# Patient Record
Sex: MALE | Race: OTHER | HISPANIC OR LATINO | Employment: OTHER | URBAN - METROPOLITAN AREA
[De-identification: names, ages, dates, MRNs, and addresses within clinical notes are randomized per-mention and may not be internally consistent; named-entity substitution may affect disease eponyms.]

---

## 2024-02-29 ENCOUNTER — OFFICE VISIT (OUTPATIENT)
Dept: OTOLARYNGOLOGY | Facility: HOSPITAL | Age: 59
End: 2024-02-29
Payer: COMMERCIAL

## 2024-02-29 VITALS — BODY MASS INDEX: 29.87 KG/M2 | TEMPERATURE: 96.8 F | HEIGHT: 69 IN | WEIGHT: 201.7 LBS

## 2024-02-29 DIAGNOSIS — J38.3 LESION OF VOCAL CORD: ICD-10-CM

## 2024-02-29 DIAGNOSIS — J34.89 NOSE DRYNESS: ICD-10-CM

## 2024-02-29 PROCEDURE — 99213 OFFICE O/P EST LOW 20 MIN: CPT | Performed by: STUDENT IN AN ORGANIZED HEALTH CARE EDUCATION/TRAINING PROGRAM

## 2024-02-29 RX ORDER — DIVALPROEX SODIUM 500 MG/1
500 TABLET, FILM COATED, EXTENDED RELEASE ORAL 2 TIMES DAILY
COMMUNITY
Start: 2019-12-30

## 2024-02-29 RX ORDER — QUETIAPINE FUMARATE 200 MG/1
1 TABLET, FILM COATED ORAL NIGHTLY
COMMUNITY
Start: 2022-05-19 | End: 2024-03-15

## 2024-02-29 RX ORDER — ATORVASTATIN CALCIUM 20 MG/1
20 TABLET, FILM COATED ORAL NIGHTLY
COMMUNITY

## 2024-02-29 RX ORDER — ALBUTEROL SULFATE 90 UG/1
AEROSOL, METERED RESPIRATORY (INHALATION)
COMMUNITY

## 2024-02-29 RX ORDER — GABAPENTIN 400 MG/1
CAPSULE ORAL
COMMUNITY
Start: 2022-05-10 | End: 2024-03-14

## 2024-02-29 RX ORDER — GABAPENTIN 800 MG/1
TABLET ORAL 4 TIMES DAILY
COMMUNITY
Start: 2023-12-08 | End: 2024-03-14

## 2024-02-29 RX ORDER — ACETAMINOPHEN 500 MG
TABLET ORAL EVERY 6 HOURS PRN
COMMUNITY

## 2024-02-29 RX ORDER — HYDROXYZINE HYDROCHLORIDE 10 MG/1
10 TABLET, FILM COATED ORAL 3 TIMES DAILY PRN
COMMUNITY

## 2024-02-29 ASSESSMENT — PATIENT HEALTH QUESTIONNAIRE - PHQ9
1. LITTLE INTEREST OR PLEASURE IN DOING THINGS: SEVERAL DAYS
SUM OF ALL RESPONSES TO PHQ9 QUESTIONS 1 & 2: 2
2. FEELING DOWN, DEPRESSED OR HOPELESS: SEVERAL DAYS

## 2024-02-29 NOTE — PROGRESS NOTES
"Chief Complaint:    Chief Complaint   Patient presents with    Follow-up     Headaches when coughing    Throat Pain       History of Present Illness:  58 y.o. 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 Pathology returned as \"squamous epithelium with hyperkeratosis and epithelial atypia, favor low grade dysplasia\". The patient also has history of nasal congestion and a septal perforation from previous drug use.     Past Medical History: Depression, bipolar disorder  Past Surgical History: Orthopedic surgeries  Social History: Heavy smoker, marijuana use. Lives at home alone  Family History: No significant family history  Allergies: No known drug allergies    02/29/24  The patient presents today for follow-up.  His voice symptoms are fairly stable.  He still continues to have a raspy voice as well as some pain in the anterior neck.  He is significantly cut down on smoking but still smokes about a carton a month.  He is no longer drinking and denies any drug use.    Social History  Social History     Socioeconomic History    Marital status: Single     Spouse name: Not on file    Number of children: Not on file    Years of education: Not on file    Highest education level: Not on file   Occupational History    Not on file   Tobacco Use    Smoking status: Every Day     Packs/day: .25     Types: Cigarettes    Smokeless tobacco: Never   Substance and Sexual Activity    Alcohol use: Not on file    Drug use: Not on file    Sexual activity: Not on file   Other Topics Concern    Not on file   Social History Narrative    Not on file     Social Determinants of Health     Financial Resource Strain: Not on file   Food Insecurity: Not on file   Transportation Needs: Not on file   Physical Activity: Not on file   Stress: Not on file   Social Connections: Not on file " "  Intimate Partner Violence: Not on file   Housing Stability: Not on file       Family History  No family history on file.    Medications:  Current Outpatient Medications   Medication Sig Dispense Refill    acetaminophen (Tylenol) 500 mg tablet Take by mouth every 6 hours if needed.      albuterol 90 mcg/actuation inhaler INHALE 2 PUFFS BY MOUTH AS DIRECTED EVERY FOUR TO SIX HOURS AS NEEDED      atorvastatin (Lipitor) 20 mg tablet Take 1 tablet (20 mg) by mouth once daily at bedtime.      divalproex (Depakote ER) 500 mg 24 hr tablet Take 1 tablet (500 mg) by mouth 2 times a day.      gabapentin (Neurontin) 400 mg capsule Take by mouth.      gabapentin (Neurontin) 800 mg tablet Take by mouth 4 times a day.      hydrOXYzine HCL (Atarax) 10 mg tablet Take 1 tablet (10 mg) by mouth 3 times a day as needed for anxiety.      loratadine 10 mg capsule Take by mouth.      QUEtiapine (SEROquel) 200 mg tablet Take 1 tablet (200 mg) by mouth once daily at bedtime.       No current facility-administered medications for this visit.       Allergies: Oxycodone-acetaminophen    Immunizations:   Immunization History   Administered Date(s) Administered    Moderna SARS-CoV-2 Vaccination 09/28/2021, 10/19/2021        Review of Systems:  Constitutional: Negative for fever, weight loss and weight gain  HENT: Negative for ear pain, sore throat and hoarseness.  Negative for difficulty swallowing  Cardiovascular: Negative for chest pain and dyspnea on exertion (Can climb up 2 floors)  Respiratory: Is not experiencing shortness of breath  Gastrointestinal: Negative for nausea and vomiting  Neurological: Negative for headaches.   Psychiatric: The patient is not nervous/anxious   Musculoskeletal: Denies muscle pain/weakness  Heme/Lymph: Negative for lymph nodes, easy bruising    Physical Exam  Vital Signs:  Temp 36 °C (96.8 °F)   Ht 1.753 m (5' 9\")   Wt 91.5 kg (201 lb 11.2 oz)   BMI 29.79 kg/m²   Constitutional   General appearance: " Healthy-appearing, well-nourished, well groomed, in no acute distress.   Ability to communicate: Normal communication without aids, normal voice quality. Hoarse and raspy voice-this seems stable from his last visit. Loud voice.  Head and face: Atraumatic with no masses, lesions, or scarring.   Facial strength: Normal strength and symmetry, no synkinesis or facial tic.   Eyes   Pupils and irises: EOM intact, PERRLA, conjunctiva non-injected.   Ears   Otoscopic examination: TMs pearly gray with no evidence of effusion or infection.   External inspection of ears: Ears normally formed and free of lesions.   Nose: Significant inferior turbinate hypertrophy nasal cavity crusting, rhinorrhea, the patient has a 1 cm anterior septal perforation, the nasal crusting has improved.  External inspection of nose: No nasal lesions, lacerations, or scars. Nasal tip symmetrical with normal nasal valves.   Oral Cavity/Mouth   Lips, teeth, and gums: Normal lips, gums. Poor dentition.  Oropharynx: Mucosa moist, no lesions. Hard and soft palate normal. Tongue normal, no lesions or edema. Tonsils normal, no lesions.   Neck: Symmetrical, trachea midline. No masses visible.   Palpation of cervical lymph nodes: No palpable lymph node enlargement, no submandibular adenopathy, no anterior cervical adenopathy, no supraclavicular adenopathy.   Neurological/Psychiatric   Cranial Nerve Examination: II - XII grossly intact.   Orientation to person, place, and time: Normal.   Mood and affect: Normal.   Skin: Normal without rashes or lesions.   Pulmonary   Respiratory effort: Chest expands symmetrically.   Cardiovascular: Good peripheral pulses   Peripheral vascular system: No varicosities, carotid pulse normal, no edema. No jugular venous distension.   Extremities Appearance of extremities: Normal. Gait normal.      Procedure  Procedure note: Recommended flexible nasopharyngoscopy. Risks, benefits, and alternatives were explained.   Procedure:  Flexible nasopharyngoscopy   Indications: History of vocal cord lesion  Anesthesia: 4% lidocaine and 0.5% phenylephrine, HurriCaine 3 was also freed into the mouth.  After adequate Afrin and lidocaine spray advance the flexible endoscope. I was able to visualize the nasal cavity and nasopharynx. The findings were notable for the following:  The patient is significant difficulty tolerating scope exam, a copious amount of lidocaine as well as HurriCaine spray was used.  The patient's nasal cavity appeared healthier than it has previously.  Moderate amount of crusting.  Anterior septal perforation appears clean and stable.  I was able to get an excellent look at the vocal cords which have bilateral hyperkeratosis.  There is bilateral movement of his vocal cords.  There is no glottic stenosis.  No obvious concerning lesions except for the superficial lesion on bilateral vocal cords, which seems stable.     Impression/Plan:  58 y.o. 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  Scope exam today was well-tolerated and shows bilateral glottic hyperkeratosis.  There is no discrete lesion that I am concerned about.    -I again reinforced the importance of smoking cessation  -I will refer the patient to speech therapy.  I am hoping that they can work on things that might help including his volume and improve his throat pain that may be related to muscle tension  -We will plan to see the patient back in 3 months

## 2024-06-19 ENCOUNTER — OFFICE VISIT (OUTPATIENT)
Dept: OTOLARYNGOLOGY | Facility: HOSPITAL | Age: 59
End: 2024-06-19
Payer: COMMERCIAL

## 2024-06-19 ENCOUNTER — LAB (OUTPATIENT)
Dept: LAB | Facility: HOSPITAL | Age: 59
End: 2024-06-19
Payer: COMMERCIAL

## 2024-06-19 ENCOUNTER — HOSPITAL ENCOUNTER (OUTPATIENT)
Dept: CARDIOLOGY | Facility: HOSPITAL | Age: 59
Discharge: HOME | End: 2024-06-19
Payer: COMMERCIAL

## 2024-06-19 ENCOUNTER — HOSPITAL ENCOUNTER (OUTPATIENT)
Dept: RADIOLOGY | Facility: HOSPITAL | Age: 59
Discharge: HOME | End: 2024-06-19
Payer: COMMERCIAL

## 2024-06-19 VITALS — WEIGHT: 191.8 LBS | HEIGHT: 69 IN | TEMPERATURE: 97.7 F | BODY MASS INDEX: 28.41 KG/M2

## 2024-06-19 DIAGNOSIS — J38.3 VOCAL FOLD LEUKOPLAKIA: ICD-10-CM

## 2024-06-19 DIAGNOSIS — R49.0 DYSPHONIA: Primary | ICD-10-CM

## 2024-06-19 DIAGNOSIS — Z01.818 PREOPERATIVE CLEARANCE: ICD-10-CM

## 2024-06-19 DIAGNOSIS — F17.200 SMOKING: ICD-10-CM

## 2024-06-19 LAB
ANION GAP SERPL CALC-SCNC: 13 MMOL/L (ref 10–20)
ATRIAL RATE: 71 BPM
BUN SERPL-MCNC: 11 MG/DL (ref 6–23)
CALCIUM SERPL-MCNC: 9.1 MG/DL (ref 8.6–10.3)
CHLORIDE SERPL-SCNC: 105 MMOL/L (ref 98–107)
CO2 SERPL-SCNC: 24 MMOL/L (ref 21–32)
CREAT SERPL-MCNC: 0.8 MG/DL (ref 0.5–1.3)
EGFRCR SERPLBLD CKD-EPI 2021: >90 ML/MIN/1.73M*2
ERYTHROCYTE [DISTWIDTH] IN BLOOD BY AUTOMATED COUNT: 13.5 % (ref 11.5–14.5)
GLUCOSE SERPL-MCNC: 147 MG/DL (ref 74–99)
HCT VFR BLD AUTO: 46.6 % (ref 41–52)
HGB BLD-MCNC: 15.4 G/DL (ref 13.5–17.5)
MCH RBC QN AUTO: 28.3 PG (ref 26–34)
MCHC RBC AUTO-ENTMCNC: 33 G/DL (ref 32–36)
MCV RBC AUTO: 86 FL (ref 80–100)
NRBC BLD-RTO: 0 /100 WBCS (ref 0–0)
P AXIS: 76 DEGREES
P OFFSET: 200 MS
P ONSET: 152 MS
PLATELET # BLD AUTO: 212 X10*3/UL (ref 150–450)
POTASSIUM SERPL-SCNC: 4 MMOL/L (ref 3.5–5.3)
PR INTERVAL: 136 MS
Q ONSET: 220 MS
QRS COUNT: 11 BEATS
QRS DURATION: 86 MS
QT INTERVAL: 392 MS
QTC CALCULATION(BAZETT): 425 MS
QTC FREDERICIA: 414 MS
R AXIS: 48 DEGREES
RBC # BLD AUTO: 5.44 X10*6/UL (ref 4.5–5.9)
SODIUM SERPL-SCNC: 138 MMOL/L (ref 136–145)
T AXIS: 49 DEGREES
T OFFSET: 416 MS
VENTRICULAR RATE: 71 BPM
WBC # BLD AUTO: 5.8 X10*3/UL (ref 4.4–11.3)

## 2024-06-19 PROCEDURE — 85027 COMPLETE CBC AUTOMATED: CPT

## 2024-06-19 PROCEDURE — 99215 OFFICE O/P EST HI 40 MIN: CPT | Performed by: OTOLARYNGOLOGY

## 2024-06-19 PROCEDURE — 71046 X-RAY EXAM CHEST 2 VIEWS: CPT

## 2024-06-19 PROCEDURE — 36415 COLL VENOUS BLD VENIPUNCTURE: CPT

## 2024-06-19 PROCEDURE — 93005 ELECTROCARDIOGRAM TRACING: CPT

## 2024-06-19 PROCEDURE — 99215 OFFICE O/P EST HI 40 MIN: CPT | Mod: 25 | Performed by: OTOLARYNGOLOGY

## 2024-06-19 PROCEDURE — 80048 BASIC METABOLIC PNL TOTAL CA: CPT

## 2024-06-19 PROCEDURE — 31579 LARYNGOSCOPY TELESCOPIC: CPT | Performed by: OTOLARYNGOLOGY

## 2024-06-19 RX ORDER — TAMSULOSIN HYDROCHLORIDE 0.4 MG/1
CAPSULE ORAL
COMMUNITY
Start: 2024-05-16

## 2024-06-19 RX ORDER — TRIAMCINOLONE ACETONIDE 1 MG/G
CREAM TOPICAL
COMMUNITY
Start: 2024-05-20

## 2024-06-19 RX ORDER — MONTELUKAST SODIUM 10 MG/1
TABLET ORAL
COMMUNITY
Start: 2024-04-01

## 2024-06-19 RX ORDER — HYDROXYZINE PAMOATE 50 MG/1
CAPSULE ORAL
COMMUNITY
Start: 2024-04-29

## 2024-06-19 RX ORDER — MELOXICAM 7.5 MG/1
TABLET ORAL
COMMUNITY
Start: 2024-05-22

## 2024-06-19 ASSESSMENT — PATIENT HEALTH QUESTIONNAIRE - PHQ9
2. FEELING DOWN, DEPRESSED OR HOPELESS: NOT AT ALL
1. LITTLE INTEREST OR PLEASURE IN DOING THINGS: NOT AT ALL
SUM OF ALL RESPONSES TO PHQ9 QUESTIONS 1 & 2: 0

## 2024-06-19 NOTE — PROGRESS NOTES
"Patient: Eriberto Biswas   MRN: [id number] YOB: 1965   Sex: male Age: 58 y.o.  Date of Service: 2024       ASSESSMENT AND PLAN  I discussed the findings with Eriberto Biswas and have recommended the followin. 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 Now with recurrent/persistent leukoplakia. Poor tolerance of office scop.   - Repeat MDL, microflap excison, KTP laser. He would like to schedule at main. Will need to stay overnight because he does not have a . Risks, benefits, and alternatives discussed with the patient, including but not limited to bleeding, infection, pain, need for repeat procedure, no improvement in symptoms, dental damage, injury to surrounding structures, and unanticipated mortality. The patient expressed understanding and agrees to proceed.       CHIEF COMPLAINT  Dysphonia, leukoplakia      HISTORY OF PRESENT ILLNESS  Eriberto Biswas is a 58 y.o. male referred by Dr. Up or evaluation of dysphonia and vocal fold leukoplakia.  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 Pathology returned as \"squamous epithelium with hyperkeratosis and epithelial atypia, favor low grade dysplasia\". The patient also has history of nasal congestion and a septal perforation from previous drug use.     24  The patient presents today for follow-up as Dr. Up has moved. His voice symptoms are fairly stable. He still continues to have a raspy voice as well as some pain in the anterior neck. He is trying to cut down on smoking     PMH: asthma, depression/bipolar, arthritis  SH: smoker 1 pack every 4 days, marijuana use. Lives at home alone     ADDITIONAL HISTORY  Past Medical History  He has no past medical history on file. Surgical History  He has no past surgical history on file.   Social History  He reports that he has been smoking " "cigarettes. He has never used smokeless tobacco. No history on file for alcohol use and drug use. Allergies  Oxycodone-acetaminophen     Family History  No family history on file.     REVIEW OF SYSTEMS  All 10 systems were reviewed and negative except for above.      PHYSICAL EXAM  ENT Physical Exam   GENERAL: Well-nourished and developed, alert and appropriate, no distress, voice [id number]  RESPIRATORY: Breathing quietly, no stridor  HEAD: Normocephalic atraumatic  FACE: Symmetric, no masses or lesions  EYES:  Pupils reactive, sclera clear, external ocular muscles intact, no nystagmus.    EARS:  Pinnae normal. External auditory canals clear and tympanic membranes intact.  NOSE:  No anterior lesions, masses or polyps.  ORAL CAVITY/OROPHARYNX:  Buccal mucosa is moist without lesions or masses, tongue midline and palate elevates symmetrically. Tongue mobility intact.  NECK:  Soft. There is no lymphadenopathy or thyromegaly.  TTP bilateral thyrohyoid space  NEUROLOGIC:  Cranial nerves II-XII grossly intact.       Last Recorded Vitals  Temperature 36.5 °C (97.7 °F), height 1.753 m (5' 9\"), weight 87 kg (191 lb 12.8 oz).    RESULTS    Patient Reported Outcome Measures  N/A    Laboratory, Radiology, and Pathology  I personally reviewed the following results, with the following interpretation:   N/A      PROCEDURES  Flexible Stroboscopy In Clinic    Date/Time: 6/19/2024 11:23 AM    Performed by: Karo Azul MD  Authorized by: Karo Azul MD       Flexible Fiberoptic Laryngoscopy with Stroboscopy    Patient failed a mirror exam due to limitations of equipment and the need for stroboscopy to assess glottic vibration and closure.     PREOPERATIVE DIAGNOSIS: Dysphonia    POSTOPERATIVE DIAGNOSIS: Same    PROCEDURE:  Strobovideolaryngoscopy    ANESTHESIA:  Topical    COMPLICATIONS:  None    SPECIMENS:  None    PROCEDURE IN DETAIL: The patient was seated in an upright position.  The nasal cavity was topically decongested and " anesthetized.  The oropharynx was anesthesized with cetacaine. The stroboscopic microphone was held to the neck at the level of the larynx.  The distal chip video laryngoscope was passed through the nasal cavity.  The nasal cavity and nasopharynx were within normal limits except noted below.  The following findings on stroboscopy were noted:      Appearance of pharynx/larynx/Other Structural Lesions: no masses or lesions   Vocal Fold Mobility               Right VF: mobile, decreased abduction               Left VF: mobile, decreased abduction   TVF Appearance               Edema/Erythema: mild Zia's               Lesions/vibratory margin irregularities: R>L diffuse VF leukoplakia   Glottic Closure Pattern: complete    Vibration:               Phase: asymmetric    Periodicity: regular                Amplitude: decreased                Waveform: decreased     Muscle Tension Patterns:  severe lateral > AP   Other abnormal findings: none    The patient tolerated the procedure fairly. Severe gag reflex.      ----------------------------------------------------------------------  Karo Azul MD, MAEd    [facility]  Department of Otolaryngology - Head and Neck Surgery  [facility]    The total time I spent in care of this patient today (excluding time spent on other billable services) is as follows:    Time Spent  Prep time on day of patient encounter: 10 minutes  Time spent directly with patient, family or caregiver: 20 minutes  Additional Time Spent on Patient Care Activities: 5 minutes  Documentation Time: 10 minutes  Other Time Spent: 0 minutes  Total: 45 minutes

## 2024-06-20 ENCOUNTER — APPOINTMENT (OUTPATIENT)
Dept: OTOLARYNGOLOGY | Facility: HOSPITAL | Age: 59
End: 2024-06-20
Payer: COMMERCIAL

## 2024-06-20 DIAGNOSIS — J38.7 LEUKOPLAKIA OF LARYNX: ICD-10-CM

## 2024-06-20 DIAGNOSIS — R49.0 DYSPHONIA: ICD-10-CM

## 2024-06-30 LAB
ATRIAL RATE: 71 BPM
P AXIS: 76 DEGREES
P OFFSET: 200 MS
P ONSET: 152 MS
PR INTERVAL: 136 MS
Q ONSET: 220 MS
QRS COUNT: 11 BEATS
QRS DURATION: 86 MS
QT INTERVAL: 392 MS
QTC CALCULATION(BAZETT): 425 MS
QTC FREDERICIA: 414 MS
R AXIS: 48 DEGREES
T AXIS: 49 DEGREES
T OFFSET: 416 MS
VENTRICULAR RATE: 71 BPM

## 2024-07-30 ENCOUNTER — ANESTHESIA EVENT (OUTPATIENT)
Dept: OPERATING ROOM | Facility: HOSPITAL | Age: 59
End: 2024-07-30
Payer: COMMERCIAL

## 2024-07-31 ENCOUNTER — HOSPITAL ENCOUNTER (OUTPATIENT)
Facility: HOSPITAL | Age: 59
Discharge: HOME | End: 2024-08-01
Attending: OTOLARYNGOLOGY | Admitting: OTOLARYNGOLOGY
Payer: COMMERCIAL

## 2024-07-31 ENCOUNTER — ANESTHESIA (OUTPATIENT)
Dept: OPERATING ROOM | Facility: HOSPITAL | Age: 59
End: 2024-07-31
Payer: COMMERCIAL

## 2024-07-31 DIAGNOSIS — J38.3: Primary | ICD-10-CM

## 2024-07-31 DIAGNOSIS — J38.7 LEUKOPLAKIA OF LARYNX: ICD-10-CM

## 2024-07-31 DIAGNOSIS — R49.0 DYSPHONIA: ICD-10-CM

## 2024-07-31 PROBLEM — J44.9 CHRONIC OBSTRUCTIVE PULMONARY DISEASE (MULTI): Status: ACTIVE | Noted: 2024-07-31

## 2024-07-31 PROCEDURE — 2500000005 HC RX 250 GENERAL PHARMACY W/O HCPCS: Performed by: ANESTHESIOLOGY

## 2024-07-31 PROCEDURE — 31541 LARYNSCOP W/TUMR EXC + SCOPE: CPT | Performed by: OTOLARYNGOLOGY

## 2024-07-31 PROCEDURE — 2500000001 HC RX 250 WO HCPCS SELF ADMINISTERED DRUGS (ALT 637 FOR MEDICARE OP): Performed by: STUDENT IN AN ORGANIZED HEALTH CARE EDUCATION/TRAINING PROGRAM

## 2024-07-31 PROCEDURE — G0378 HOSPITAL OBSERVATION PER HR: HCPCS

## 2024-07-31 PROCEDURE — 96372 THER/PROPH/DIAG INJ SC/IM: CPT | Performed by: STUDENT IN AN ORGANIZED HEALTH CARE EDUCATION/TRAINING PROGRAM

## 2024-07-31 PROCEDURE — 3600000007 HC OR TIME - EACH INCREMENTAL 1 MINUTE - PROCEDURE LEVEL TWO: Performed by: OTOLARYNGOLOGY

## 2024-07-31 PROCEDURE — 3700000001 HC GENERAL ANESTHESIA TIME - INITIAL BASE CHARGE: Performed by: OTOLARYNGOLOGY

## 2024-07-31 PROCEDURE — 7100000002 HC RECOVERY ROOM TIME - EACH INCREMENTAL 1 MINUTE: Performed by: OTOLARYNGOLOGY

## 2024-07-31 PROCEDURE — 7100000001 HC RECOVERY ROOM TIME - INITIAL BASE CHARGE: Performed by: OTOLARYNGOLOGY

## 2024-07-31 PROCEDURE — A31541 PR LARYNGOSCOPY,DIRCT,OP SCOP,EXC TUMR: Performed by: ANESTHESIOLOGY

## 2024-07-31 PROCEDURE — 96372 THER/PROPH/DIAG INJ SC/IM: CPT | Performed by: OTOLARYNGOLOGY

## 2024-07-31 PROCEDURE — 31622 DX BRONCHOSCOPE/WASH: CPT | Performed by: OTOLARYNGOLOGY

## 2024-07-31 PROCEDURE — 88305 TISSUE EXAM BY PATHOLOGIST: CPT | Mod: TC,SUR | Performed by: OTOLARYNGOLOGY

## 2024-07-31 PROCEDURE — 2500000005 HC RX 250 GENERAL PHARMACY W/O HCPCS

## 2024-07-31 PROCEDURE — A4649 SURGICAL SUPPLIES: HCPCS | Performed by: OTOLARYNGOLOGY

## 2024-07-31 PROCEDURE — 2500000004 HC RX 250 GENERAL PHARMACY W/ HCPCS (ALT 636 FOR OP/ED)

## 2024-07-31 PROCEDURE — 2720000007 HC OR 272 NO HCPCS: Performed by: OTOLARYNGOLOGY

## 2024-07-31 PROCEDURE — 3700000002 HC GENERAL ANESTHESIA TIME - EACH INCREMENTAL 1 MINUTE: Performed by: OTOLARYNGOLOGY

## 2024-07-31 PROCEDURE — 2500000001 HC RX 250 WO HCPCS SELF ADMINISTERED DRUGS (ALT 637 FOR MEDICARE OP): Performed by: OTOLARYNGOLOGY

## 2024-07-31 PROCEDURE — 2500000004 HC RX 250 GENERAL PHARMACY W/ HCPCS (ALT 636 FOR OP/ED): Performed by: STUDENT IN AN ORGANIZED HEALTH CARE EDUCATION/TRAINING PROGRAM

## 2024-07-31 PROCEDURE — 2500000005 HC RX 250 GENERAL PHARMACY W/O HCPCS: Performed by: OTOLARYNGOLOGY

## 2024-07-31 PROCEDURE — 3600000002 HC OR TIME - INITIAL BASE CHARGE - PROCEDURE LEVEL TWO: Performed by: OTOLARYNGOLOGY

## 2024-07-31 PROCEDURE — 31571 LARYNGOSCOP W/VC INJ + SCOPE: CPT | Performed by: OTOLARYNGOLOGY

## 2024-07-31 PROCEDURE — 2500000004 HC RX 250 GENERAL PHARMACY W/ HCPCS (ALT 636 FOR OP/ED): Performed by: ANESTHESIOLOGY

## 2024-07-31 PROCEDURE — 2500000004 HC RX 250 GENERAL PHARMACY W/ HCPCS (ALT 636 FOR OP/ED): Performed by: OTOLARYNGOLOGY

## 2024-07-31 RX ORDER — ACETAMINOPHEN 325 MG/1
975 TABLET ORAL EVERY 8 HOURS SCHEDULED
Status: DISCONTINUED | OUTPATIENT
Start: 2024-07-31 | End: 2024-08-01 | Stop reason: HOSPADM

## 2024-07-31 RX ORDER — ACETAMINOPHEN 325 MG/1
650 TABLET ORAL EVERY 4 HOURS PRN
Status: DISCONTINUED | OUTPATIENT
Start: 2024-07-31 | End: 2024-07-31 | Stop reason: HOSPADM

## 2024-07-31 RX ORDER — ESMOLOL HYDROCHLORIDE 10 MG/ML
INJECTION INTRAVENOUS AS NEEDED
Status: DISCONTINUED | OUTPATIENT
Start: 2024-07-31 | End: 2024-07-31

## 2024-07-31 RX ORDER — HYDROMORPHONE HYDROCHLORIDE 1 MG/ML
0.5 INJECTION, SOLUTION INTRAMUSCULAR; INTRAVENOUS; SUBCUTANEOUS EVERY 5 MIN PRN
Status: DISCONTINUED | OUTPATIENT
Start: 2024-07-31 | End: 2024-07-31 | Stop reason: HOSPADM

## 2024-07-31 RX ORDER — IBUPROFEN 600 MG/1
600 TABLET ORAL EVERY 8 HOURS
Status: DISCONTINUED | OUTPATIENT
Start: 2024-08-01 | End: 2024-07-31

## 2024-07-31 RX ORDER — SODIUM CHLORIDE, SODIUM LACTATE, POTASSIUM CHLORIDE, CALCIUM CHLORIDE 600; 310; 30; 20 MG/100ML; MG/100ML; MG/100ML; MG/100ML
50 INJECTION, SOLUTION INTRAVENOUS CONTINUOUS
Status: DISCONTINUED | OUTPATIENT
Start: 2024-07-31 | End: 2024-07-31 | Stop reason: HOSPADM

## 2024-07-31 RX ORDER — ONDANSETRON HYDROCHLORIDE 2 MG/ML
4 INJECTION, SOLUTION INTRAVENOUS EVERY 8 HOURS PRN
Status: DISCONTINUED | OUTPATIENT
Start: 2024-07-31 | End: 2024-08-01 | Stop reason: HOSPADM

## 2024-07-31 RX ORDER — ONDANSETRON HYDROCHLORIDE 2 MG/ML
4 INJECTION, SOLUTION INTRAVENOUS ONCE AS NEEDED
Status: DISCONTINUED | OUTPATIENT
Start: 2024-07-31 | End: 2024-07-31 | Stop reason: HOSPADM

## 2024-07-31 RX ORDER — QUETIAPINE FUMARATE 25 MG/1
200 TABLET, FILM COATED ORAL NIGHTLY
Status: DISCONTINUED | OUTPATIENT
Start: 2024-07-31 | End: 2024-08-01 | Stop reason: HOSPADM

## 2024-07-31 RX ORDER — HYDROMORPHONE HYDROCHLORIDE 1 MG/ML
0.5 INJECTION, SOLUTION INTRAMUSCULAR; INTRAVENOUS; SUBCUTANEOUS EVERY 5 MIN PRN
Status: COMPLETED | OUTPATIENT
Start: 2024-07-31 | End: 2024-07-31

## 2024-07-31 RX ORDER — WATER 1 ML/ML
IRRIGANT IRRIGATION AS NEEDED
Status: DISCONTINUED | OUTPATIENT
Start: 2024-07-31 | End: 2024-07-31 | Stop reason: HOSPADM

## 2024-07-31 RX ORDER — MIDAZOLAM HYDROCHLORIDE 1 MG/ML
INJECTION INTRAMUSCULAR; INTRAVENOUS AS NEEDED
Status: DISCONTINUED | OUTPATIENT
Start: 2024-07-31 | End: 2024-07-31

## 2024-07-31 RX ORDER — NALOXONE HYDROCHLORIDE 0.4 MG/ML
0.2 INJECTION, SOLUTION INTRAMUSCULAR; INTRAVENOUS; SUBCUTANEOUS EVERY 5 MIN PRN
Status: DISCONTINUED | OUTPATIENT
Start: 2024-07-31 | End: 2024-08-01 | Stop reason: HOSPADM

## 2024-07-31 RX ORDER — SODIUM CHLORIDE 0.9 G/100ML
IRRIGANT IRRIGATION AS NEEDED
Status: DISCONTINUED | OUTPATIENT
Start: 2024-07-31 | End: 2024-07-31 | Stop reason: HOSPADM

## 2024-07-31 RX ORDER — HYDROMORPHONE HYDROCHLORIDE 1 MG/ML
0.2 INJECTION, SOLUTION INTRAMUSCULAR; INTRAVENOUS; SUBCUTANEOUS
Status: DISCONTINUED | OUTPATIENT
Start: 2024-07-31 | End: 2024-08-01 | Stop reason: HOSPADM

## 2024-07-31 RX ORDER — ONDANSETRON HYDROCHLORIDE 2 MG/ML
INJECTION, SOLUTION INTRAVENOUS AS NEEDED
Status: DISCONTINUED | OUTPATIENT
Start: 2024-07-31 | End: 2024-07-31

## 2024-07-31 RX ORDER — HYDROMORPHONE HYDROCHLORIDE 1 MG/ML
0.2 INJECTION, SOLUTION INTRAMUSCULAR; INTRAVENOUS; SUBCUTANEOUS EVERY 5 MIN PRN
Status: DISCONTINUED | OUTPATIENT
Start: 2024-07-31 | End: 2024-07-31 | Stop reason: HOSPADM

## 2024-07-31 RX ORDER — METHOCARBAMOL 500 MG/1
1000 TABLET, FILM COATED ORAL EVERY 8 HOURS SCHEDULED
Status: DISCONTINUED | OUTPATIENT
Start: 2024-08-01 | End: 2024-08-01 | Stop reason: HOSPADM

## 2024-07-31 RX ORDER — ONDANSETRON 4 MG/1
4 TABLET, ORALLY DISINTEGRATING ORAL EVERY 8 HOURS PRN
Status: DISCONTINUED | OUTPATIENT
Start: 2024-07-31 | End: 2024-08-01 | Stop reason: HOSPADM

## 2024-07-31 RX ORDER — PANTOPRAZOLE SODIUM 40 MG/10ML
40 INJECTION, POWDER, LYOPHILIZED, FOR SOLUTION INTRAVENOUS
Status: DISCONTINUED | OUTPATIENT
Start: 2024-08-01 | End: 2024-08-01 | Stop reason: HOSPADM

## 2024-07-31 RX ORDER — LIDOCAINE HYDROCHLORIDE 20 MG/ML
INJECTION, SOLUTION INFILTRATION; PERINEURAL AS NEEDED
Status: DISCONTINUED | OUTPATIENT
Start: 2024-07-31 | End: 2024-07-31

## 2024-07-31 RX ORDER — POLYETHYLENE GLYCOL 3350 17 G/17G
17 POWDER, FOR SOLUTION ORAL DAILY
Status: DISCONTINUED | OUTPATIENT
Start: 2024-08-01 | End: 2024-08-01 | Stop reason: HOSPADM

## 2024-07-31 RX ORDER — SODIUM CHLORIDE, SODIUM LACTATE, POTASSIUM CHLORIDE, CALCIUM CHLORIDE 600; 310; 30; 20 MG/100ML; MG/100ML; MG/100ML; MG/100ML
INJECTION, SOLUTION INTRAVENOUS CONTINUOUS PRN
Status: DISCONTINUED | OUTPATIENT
Start: 2024-07-31 | End: 2024-07-31

## 2024-07-31 RX ORDER — ALBUTEROL SULFATE 90 UG/1
2 AEROSOL, METERED RESPIRATORY (INHALATION) EVERY 4 HOURS PRN
Status: DISCONTINUED | OUTPATIENT
Start: 2024-07-31 | End: 2024-08-01 | Stop reason: HOSPADM

## 2024-07-31 RX ORDER — DIVALPROEX SODIUM 500 MG/1
500 TABLET, FILM COATED, EXTENDED RELEASE ORAL 2 TIMES DAILY
Status: DISCONTINUED | OUTPATIENT
Start: 2024-07-31 | End: 2024-08-01 | Stop reason: HOSPADM

## 2024-07-31 RX ORDER — GABAPENTIN 300 MG/1
1200 CAPSULE ORAL NIGHTLY
Status: DISCONTINUED | OUTPATIENT
Start: 2024-07-31 | End: 2024-08-01 | Stop reason: HOSPADM

## 2024-07-31 RX ORDER — ROCURONIUM BROMIDE 10 MG/ML
INJECTION, SOLUTION INTRAVENOUS AS NEEDED
Status: DISCONTINUED | OUTPATIENT
Start: 2024-07-31 | End: 2024-07-31

## 2024-07-31 RX ORDER — ENOXAPARIN SODIUM 100 MG/ML
40 INJECTION SUBCUTANEOUS EVERY 24 HOURS
Status: DISCONTINUED | OUTPATIENT
Start: 2024-07-31 | End: 2024-08-01 | Stop reason: HOSPADM

## 2024-07-31 RX ORDER — PROPOFOL 10 MG/ML
INJECTION, EMULSION INTRAVENOUS AS NEEDED
Status: DISCONTINUED | OUTPATIENT
Start: 2024-07-31 | End: 2024-07-31

## 2024-07-31 RX ORDER — LIDOCAINE HYDROCHLORIDE 10 MG/ML
0.1 INJECTION INFILTRATION; PERINEURAL ONCE
Status: DISCONTINUED | OUTPATIENT
Start: 2024-07-31 | End: 2024-07-31 | Stop reason: HOSPADM

## 2024-07-31 RX ORDER — EPINEPHRINE 1 MG/ML
INJECTION, SOLUTION, CONCENTRATE INTRAVENOUS AS NEEDED
Status: DISCONTINUED | OUTPATIENT
Start: 2024-07-31 | End: 2024-07-31 | Stop reason: HOSPADM

## 2024-07-31 RX ORDER — ATORVASTATIN CALCIUM 20 MG/1
20 TABLET, FILM COATED ORAL NIGHTLY
Status: DISCONTINUED | OUTPATIENT
Start: 2024-07-31 | End: 2024-08-01 | Stop reason: HOSPADM

## 2024-07-31 SDOH — ECONOMIC STABILITY: TRANSPORTATION INSECURITY
IN THE PAST 12 MONTHS, HAS THE LACK OF TRANSPORTATION KEPT YOU FROM MEDICAL APPOINTMENTS OR FROM GETTING MEDICATIONS?: YES

## 2024-07-31 SDOH — SOCIAL STABILITY: SOCIAL INSECURITY: HAVE YOU HAD ANY THOUGHTS OF HARMING ANYONE ELSE?: NO

## 2024-07-31 SDOH — ECONOMIC STABILITY: HOUSING INSECURITY: IN THE PAST 12 MONTHS, HOW MANY TIMES HAVE YOU MOVED WHERE YOU WERE LIVING?: 1

## 2024-07-31 SDOH — SOCIAL STABILITY: SOCIAL INSECURITY: HAS ANYONE EVER THREATENED TO HURT YOUR FAMILY OR YOUR PETS?: NO

## 2024-07-31 SDOH — ECONOMIC STABILITY: INCOME INSECURITY: HOW HARD IS IT FOR YOU TO PAY FOR THE VERY BASICS LIKE FOOD, HOUSING, MEDICAL CARE, AND HEATING?: NOT VERY HARD

## 2024-07-31 SDOH — SOCIAL STABILITY: SOCIAL INSECURITY: ABUSE: ADULT

## 2024-07-31 SDOH — SOCIAL STABILITY: SOCIAL INSECURITY: DO YOU FEEL UNSAFE GOING BACK TO THE PLACE WHERE YOU ARE LIVING?: NO

## 2024-07-31 SDOH — SOCIAL STABILITY: SOCIAL INSECURITY: DOES ANYONE TRY TO KEEP YOU FROM HAVING/CONTACTING OTHER FRIENDS OR DOING THINGS OUTSIDE YOUR HOME?: NO

## 2024-07-31 SDOH — SOCIAL STABILITY: SOCIAL INSECURITY: ARE YOU OR HAVE YOU BEEN THREATENED OR ABUSED PHYSICALLY, EMOTIONALLY, OR SEXUALLY BY ANYONE?: NO

## 2024-07-31 SDOH — ECONOMIC STABILITY: INCOME INSECURITY: IN THE LAST 12 MONTHS, WAS THERE A TIME WHEN YOU WERE NOT ABLE TO PAY THE MORTGAGE OR RENT ON TIME?: NO

## 2024-07-31 SDOH — SOCIAL STABILITY: SOCIAL INSECURITY: HAVE YOU HAD THOUGHTS OF HARMING ANYONE ELSE?: NO

## 2024-07-31 SDOH — SOCIAL STABILITY: SOCIAL INSECURITY: WERE YOU ABLE TO COMPLETE ALL THE BEHAVIORAL HEALTH SCREENINGS?: YES

## 2024-07-31 SDOH — SOCIAL STABILITY: SOCIAL INSECURITY: DO YOU FEEL ANYONE HAS EXPLOITED OR TAKEN ADVANTAGE OF YOU FINANCIALLY OR OF YOUR PERSONAL PROPERTY?: NO

## 2024-07-31 SDOH — ECONOMIC STABILITY: HOUSING INSECURITY: AT ANY TIME IN THE PAST 12 MONTHS, WERE YOU HOMELESS OR LIVING IN A SHELTER (INCLUDING NOW)?: NO

## 2024-07-31 SDOH — SOCIAL STABILITY: SOCIAL INSECURITY: ARE THERE ANY APPARENT SIGNS OF INJURIES/BEHAVIORS THAT COULD BE RELATED TO ABUSE/NEGLECT?: NO

## 2024-07-31 SDOH — ECONOMIC STABILITY: TRANSPORTATION INSECURITY
IN THE PAST 12 MONTHS, HAS LACK OF TRANSPORTATION KEPT YOU FROM MEETINGS, WORK, OR FROM GETTING THINGS NEEDED FOR DAILY LIVING?: YES

## 2024-07-31 ASSESSMENT — COGNITIVE AND FUNCTIONAL STATUS - GENERAL
DAILY ACTIVITIY SCORE: 24
PATIENT BASELINE BEDBOUND: NO
MOBILITY SCORE: 24
DAILY ACTIVITIY SCORE: 24
MOBILITY SCORE: 24

## 2024-07-31 ASSESSMENT — ACTIVITIES OF DAILY LIVING (ADL)
ASSISTIVE_DEVICE: EYEGLASSES
HEARING - RIGHT EAR: FUNCTIONAL
PATIENT'S MEMORY ADEQUATE TO SAFELY COMPLETE DAILY ACTIVITIES?: YES
BATHING: INDEPENDENT
HEARING - LEFT EAR: FUNCTIONAL
ADEQUATE_TO_COMPLETE_ADL: YES
GROOMING: INDEPENDENT
JUDGMENT_ADEQUATE_SAFELY_COMPLETE_DAILY_ACTIVITIES: YES
TOILETING: INDEPENDENT
WALKS IN HOME: INDEPENDENT
DRESSING YOURSELF: INDEPENDENT
FEEDING YOURSELF: INDEPENDENT

## 2024-07-31 ASSESSMENT — PAIN - FUNCTIONAL ASSESSMENT
PAIN_FUNCTIONAL_ASSESSMENT: 0-10
PAIN_FUNCTIONAL_ASSESSMENT: 0-10
PAIN_FUNCTIONAL_ASSESSMENT: UNABLE TO SELF-REPORT
PAIN_FUNCTIONAL_ASSESSMENT: 0-10
PAIN_FUNCTIONAL_ASSESSMENT: UNABLE TO SELF-REPORT

## 2024-07-31 ASSESSMENT — PAIN SCALES - GENERAL
PAINLEVEL_OUTOF10: 4
PAINLEVEL_OUTOF10: 8
PAINLEVEL_OUTOF10: 10 - WORST POSSIBLE PAIN
PAINLEVEL_OUTOF10: 10 - WORST POSSIBLE PAIN
PAINLEVEL_OUTOF10: 8
PAINLEVEL_OUTOF10: 10 - WORST POSSIBLE PAIN

## 2024-07-31 ASSESSMENT — COLUMBIA-SUICIDE SEVERITY RATING SCALE - C-SSRS
6. HAVE YOU EVER DONE ANYTHING, STARTED TO DO ANYTHING, OR PREPARED TO DO ANYTHING TO END YOUR LIFE?: NO
2. HAVE YOU ACTUALLY HAD ANY THOUGHTS OF KILLING YOURSELF?: NO
6. HAVE YOU EVER DONE ANYTHING, STARTED TO DO ANYTHING, OR PREPARED TO DO ANYTHING TO END YOUR LIFE?: YES
1. IN THE PAST MONTH, HAVE YOU WISHED YOU WERE DEAD OR WISHED YOU COULD GO TO SLEEP AND NOT WAKE UP?: NO

## 2024-07-31 ASSESSMENT — LIFESTYLE VARIABLES
AUDIT-C TOTAL SCORE: 0
SUBSTANCE_ABUSE_PAST_12_MONTHS: NO
AUDIT-C TOTAL SCORE: 0
PRESCIPTION_ABUSE_PAST_12_MONTHS: NO
HOW MANY STANDARD DRINKS CONTAINING ALCOHOL DO YOU HAVE ON A TYPICAL DAY: PATIENT DOES NOT DRINK
HOW OFTEN DO YOU HAVE A DRINK CONTAINING ALCOHOL: NEVER
SKIP TO QUESTIONS 9-10: 1
HOW OFTEN DO YOU HAVE 6 OR MORE DRINKS ON ONE OCCASION: NEVER

## 2024-07-31 ASSESSMENT — PAIN DESCRIPTION - LOCATION
LOCATION: THROAT
LOCATION: MOUTH

## 2024-07-31 NOTE — OP NOTE
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 Additional anterior infraglottic leukplakia also ablated with KTP.  Left vocal fold with sulcus vocalis s/p steroid injection  No other masses or lesions appreciated in the upper aerodigestive tract    Estimated Blood Loss:  Minimal    Implantables/Drains:  None    Complications:  None    Description of Procedure:  The patient was taken to the operative suite and transferred to the operating table and laid supine. A pre-operative timeout was performed with all participating parties. General anesthesia was induced, and the patient was intubated with a 5-0 laser-safe Tenax tube. A final timeout was completed and we began with our procedure.     A dental guard was used to protect the patient's teeth. The eyes, face and neck were protected appropriately from laser instrumentation. A Dedo laryngoscope was used to expose the larynx and the patient was  suspended to allow visualization of the larynx with the findings noted above. The telescope was then advanced past the glottis to visualize the trachea and distal airways down to the mainstem bronchi to rule out any further lesions or airway narrowing with the findings noted above. Photos were taken with 0 degree telescope. A wet cottonoid was placed subglottically to protect the laser safe tube.      The operating microscope was brought into the field. A subepithelial infusion of dexamethasone was injected into the right vocal fold for hydrodissection.  The sickle knife was used to make an incision along the superior surface of the vocal cord just lateral to the area of leukoplakia.  This was carried through the mucosa and then brought forward towards the anterior vocal cord.  Flap elevator was used to elevate the subepithelial flap away from the vocal ligament and sharp dissection was used to excise the flap.  This was sent for permanent pathologic analysis.  The KTP laser was then brought into the field and the area around the excised lesion was ablated.  Some scattered areas of additional leukoplakia were then subsequently ablated as well, specifically along the right inferior cord/infraglottic region. Hemostasis was achieved with epinephrine-soaked cottonoids.      A sulcus was noted in the left true vocal fold and 0.3 mL of dexamethasone were injected to soften the scar.  The subglottic cottonoid was removed, laryngotracheal anesthesia was applied, and the patient was taken out of suspension. The patient was turned back to our anesthesia colleagues for an uncomplicated wakeup.    Complications:  None; patient tolerated the procedure well.    Disposition: PACU - hemodynamically stable.  Condition: stable     I was present and participated in all critical portions of this procedure.    Karo Azul MD, MAEd    [facility] [employer]  [facility]  Department of Otolaryngology - Head and Neck Surgery  [facility]

## 2024-07-31 NOTE — ANESTHESIA PROCEDURE NOTES
Airway  Date/Time: 7/31/2024 2:08 PM  Urgency: elective    Airway not difficult    Staffing  Performed: resident   Authorized by: Doris Golden MD    Performed by: Marleny Edwards MD  Patient location during procedure: OR    Indications and Patient Condition  Indications for airway management: anesthesia  Spontaneous Ventilation: absent  Sedation level: deep  Preoxygenated: yes  Patient position: sniffing  MILS not maintained throughout  Mask difficulty assessment: 2 - vent by mask + OA or adjuvant +/- NMBA  Planned trial extubation    Final Airway Details  Final airway type: endotracheal airway      Successful airway: ETT and laser tube  Cuffed: yes   Successful intubation technique: video laryngoscopy  Facilitating devices/methods: intubating stylet  Endotracheal tube insertion site: oral  Blade size: #3  ETT size (mm): 5.0  Cormack-Lehane Classification: grade I - full view of glottis  Placement verified by: chest auscultation and capnometry   Inital cuff pressure (cm H2O): 10  Measured from: lips  ETT to lips (cm): 21  Number of attempts at approach: 1

## 2024-07-31 NOTE — HOSPITAL COURSE
This patient is a 59-year-old male smoker who was admitted following a MicroDirect laryngoscopy and KTP laser ablation/microflap excision of right vocal cord leukoplakia.  He was admitted due to issues with transportation.  He recovered briefly in the postanesthesia care unit before being transitioned to the regular nursing floor.  His pain was controlled using enteral medication.  His oxygen was weaned to room air.  His diet was advanced as tolerated.  On postoperative day 1, transportation was arranged and he was discharged home in stable condition.  He has outpatient follow-up scheduled to review pathology and repeat endoscopy.  Questions were answered.  Patient was agreeable to plan.

## 2024-07-31 NOTE — ANESTHESIA POSTPROCEDURE EVALUATION
Patient: Eriberto Biswas    Procedure Summary       Date: 07/31/24 Room / Location: [facility] OR 05 / Virtual [facility] OR    Anesthesia Start: 1355 Anesthesia Stop: 1605    Procedure: MICRODIRECT LARYNGOSCOPY, BRONCHOSCOPY, MICROFLAP EXCISION, KTP LASER Diagnosis:       Dysphonia      Leukoplakia of larynx      (Dysphonia [R49.0])      (Leukoplakia of larynx [J38.7])    Surgeons: Karo Azul MD Responsible Provider: Doris Golden MD    Anesthesia Type: general ASA Status: 3            Anesthesia Type: general    Vitals Value Taken Time   /95 07/31/24 1612   Temp 36.4 07/31/24 1612   Pulse 65 07/31/24 1606   Resp 18 07/31/24 1606   SpO2 99 % 07/31/24 1606   Vitals shown include unfiled device data.    Anesthesia Post Evaluation    Patient location during evaluation: PACU  Patient participation: complete - patient participated  Level of consciousness: awake and alert  Pain management: adequate  Airway patency: patent  Cardiovascular status: acceptable  Respiratory status: acceptable and BIPAP  Hydration status: acceptable  Postoperative Nausea and Vomiting: none        No notable events documented.

## 2024-07-31 NOTE — H&P
"History Of Present Illness  Eriberto Biswas is a 59 y.o. male presenting with a history of vocal fold leukoplakia that was previously excised, now with suspected recurrence. Given this, decision was made to proceed to the operating room for MDL with KTP laser ablation of vocal cord lesion. This was after discussing the risks, benefits, and alternatives of proceeding. There have been no major changes to patient's medical status since the outpatient ENT visit. Patient is overall in usual state of health this morning.      Past Medical History  He has a past medical history of Anxiety and depression, Chronic pain, GERD (gastroesophageal reflux disease), History of substance abuse (Multi), History of suicide attempt, and HLD (hyperlipidemia).    Surgical History  He has a past surgical history that includes Bronchoscopy and Vein Surgery.     Social History  He reports that he has been smoking cigarettes. He has never used smokeless tobacco. He reports that he does not currently use alcohol. He reports that he does not currently use drugs after having used the following drugs: \"Crack\" cocaine.    Family History  No family history on file.     Allergies  Oxycodone-acetaminophen    ROS:  Complete ROS negative other than mentioned in the HPI.     PHYSICAL EXAMINATION:  General Healthy-appearing, well-nourished, well groomed, in no acute distress.   Neuro: Developmentally appropriate for age. Reacts appropriately to commands or stimuli.   Extremities Normal. Good tone.  Respiratory No increased work of breathing. Chest expands symmetrically. No stertor or stridor at rest. Rough voice quality.   Cardiovascular: No peripheral cyanosis. No jugular venous distension.   Head and Face: Atraumatic with no masses, lesions, or scarring.   Eyes: EOM intact, conjunctiva non-injected, sclera white.   Nose: no external nasal lesions, lacerations, or scars.  Neck: Symmetrical, trachea midline.   Skin: Normal without rashes or lesions.     " "  Last Recorded Vitals  Blood pressure 136/77, pulse 70, temperature 36.6 °C (97.9 °F), temperature source Temporal, resp. rate 18, height 1.753 m (5' 9\"), weight 89.6 kg (197 lb 8.5 oz), SpO2 96%.    Assessment/Plan   Eriberto Biswas is a 59 y.o. male presenting with bilateral vocal fold leukoplakia.    At this time, we will proceed to the operating room for MDL with possible laser ablation of leukoplakia versus microflap excision.    Risks, benefits, and alternatives were discussed with the patient. All other questions were answered.     Plan for admission following surgery given patient does not have a ride home and no local support.        "

## 2024-07-31 NOTE — ANESTHESIA PREPROCEDURE EVALUATION
Patient: Eriberto Biswas    Procedure Information       Date/Time: 07/31/24 1445    Procedure: MICRODIRECT LARYNGOSCOPY, BRONCHOSCOPY, MICROFLAP EXCISION, KTP LASER    Location: [facility] OR 05 / Virtual [facility] OR    Surgeons: Karo Azul MD            Relevant Problems   Anesthesia (within normal limits)      Pulmonary   (+) Chronic obstructive pulmonary disease (Multi)      HEENT  Vocal cord leukoplakia.       Clinical information reviewed:   Tobacco  Allergies  Meds   Med Hx  Surg Hx   Fam Hx  Soc Hx        NPO Detail:  NPO/Void Status  Date of Last Liquid: 07/31/24  Time of Last Liquid: 0000  Date of Last Solid: 07/31/24  Time of Last Solid: 0000         Physical Exam    Airway  Mallampati: III     Cardiovascular    Dental    Pulmonary    Abdominal            Anesthesia Plan    History of general anesthesia?: yes  History of complications of general anesthesia?: no    ASA 3     general     intravenous induction   Anesthetic plan and risks discussed with patient.    Plan discussed with resident.

## 2024-07-31 NOTE — DISCHARGE INSTRUCTIONS
Postoperative Instructions: Microdirect Laryngoscopy    General: Pain of the nose and throat can be normal after these procedures. A small amount of blood from the nose or mouth can be expected.  Diet: Start with liquids after anesthesia. Soft foods may feel best for the first 1-2 days following your procedure. Soft foods include soup, noodles, scrambled eggs, oatmeal, yogurt, smoothies, applesauce, mashed potatoes, pasta or ice cream. Avoid toast, chips, hard crusted breads, and steak or similar meats. After your throat begins to feel less sore, you can continue your normal diet. Most importantly, stay hydrated.  Voice Rest: Please rest your voice for 24 hours after surgery this means no talking, even whispering. Do your best to limit coughing.  Pain Control: You may experience a mild to moderate sore throat or tongue for several days following the procedure. The throat pain may also seem to cause earaches from referred pain. We encourage use of acetaminophen (Tylenol) and /or ibuprofen (Motrin/Advil) for most pain control. These two medications can be taken together or can be alternated. We will sometimes prescribe you something stronger for pain for “break through.”  Use it only as needed. Do not drive while taking any narcotic pain medications.  Follow-up: Your follow-up with your doctor should be scheduled 3-4 weeks following surgery. If a biopsy was preformed, results will usually be available in the system 7 days following the surgery. You will be informed of the results.   Please call the office or go to the emergency room if you experience any of the following: difficulty breathing, inability to swallow, severe chest pain, fever great than 101 degrees, significant bleeding, or any new/concerning symptoms.  Contact:  During office hours between 8 AM and 5 PM, please call Dr. Azul's office at [phone].  If you have an emergency over night or on  the weekend, please call [phone] and ask the  to connect you to the ENT resident on call.

## 2024-08-01 VITALS
BODY MASS INDEX: 29.26 KG/M2 | DIASTOLIC BLOOD PRESSURE: 78 MMHG | HEART RATE: 86 BPM | HEIGHT: 69 IN | WEIGHT: 197.53 LBS | RESPIRATION RATE: 16 BRPM | TEMPERATURE: 98.4 F | SYSTOLIC BLOOD PRESSURE: 138 MMHG | OXYGEN SATURATION: 97 %

## 2024-08-01 PROCEDURE — C9113 INJ PANTOPRAZOLE SODIUM, VIA: HCPCS | Performed by: STUDENT IN AN ORGANIZED HEALTH CARE EDUCATION/TRAINING PROGRAM

## 2024-08-01 PROCEDURE — 7100000011 HC EXTENDED STAY RECOVERY HOURLY - NURSING UNIT

## 2024-08-01 PROCEDURE — 2500000004 HC RX 250 GENERAL PHARMACY W/ HCPCS (ALT 636 FOR OP/ED): Performed by: STUDENT IN AN ORGANIZED HEALTH CARE EDUCATION/TRAINING PROGRAM

## 2024-08-01 PROCEDURE — 2500000001 HC RX 250 WO HCPCS SELF ADMINISTERED DRUGS (ALT 637 FOR MEDICARE OP)

## 2024-08-01 PROCEDURE — 2500000004 HC RX 250 GENERAL PHARMACY W/ HCPCS (ALT 636 FOR OP/ED): Performed by: ANESTHESIOLOGY

## 2024-08-01 PROCEDURE — 2500000001 HC RX 250 WO HCPCS SELF ADMINISTERED DRUGS (ALT 637 FOR MEDICARE OP): Performed by: STUDENT IN AN ORGANIZED HEALTH CARE EDUCATION/TRAINING PROGRAM

## 2024-08-01 PROCEDURE — 99238 HOSP IP/OBS DSCHRG MGMT 30/<: CPT | Performed by: OTOLARYNGOLOGY

## 2024-08-01 PROCEDURE — G0378 HOSPITAL OBSERVATION PER HR: HCPCS

## 2024-08-01 ASSESSMENT — COGNITIVE AND FUNCTIONAL STATUS - GENERAL
DAILY ACTIVITIY SCORE: 24
MOBILITY SCORE: 24

## 2024-08-01 ASSESSMENT — PAIN - FUNCTIONAL ASSESSMENT
PAIN_FUNCTIONAL_ASSESSMENT: 0-10

## 2024-08-01 ASSESSMENT — PAIN SCALES - GENERAL
PAINLEVEL_OUTOF10: 10 - WORST POSSIBLE PAIN
PAINLEVEL_OUTOF10: 7
PAINLEVEL_OUTOF10: 10 - WORST POSSIBLE PAIN
PAINLEVEL_OUTOF10: 10 - WORST POSSIBLE PAIN

## 2024-08-01 NOTE — NURSING NOTE
1335 8/1/24:     Pt discharge complete. IV removed, catheter intact. Discharge instructions printed and reviewed with patient. No questions or concerns at this time. All belongings packed and sent with patient. Pt refused to wait 30 minutes post-medication administration prior to leaving. Pt educated on risks of refusal. Vital signs stable at time of discharge. Pt ambulated to lobby to discharge home via friend who is picking him up.     GONZALES Puri RN

## 2024-08-01 NOTE — DISCHARGE SUMMARY
Discharge Diagnosis  Vocal cord leukoplakia    Issues Requiring Follow-Up  none    Test Results Pending At Discharge  Pending Labs       Order Current Status    Surgical Pathology Exam In process            Hospital Course  This patient is a 59-year-old male smoker who was admitted following a MicroDirect laryngoscopy and KTP laser ablation/microflap excision of right vocal cord leukoplakia.  He was admitted due to issues with transportation.  He recovered briefly in the postanesthesia care unit before being transitioned to the regular nursing floor.  His pain was controlled using oral medication.  His oxygen was weaned to room air.  His diet was advanced as tolerated.  On postoperative day 1, transportation was arranged and he was discharged home in stable condition.  He has outpatient follow-up scheduled to review pathology and repeat endoscopy.  Questions were answered.  Patient was agreeable to plan.    Pertinent Physical Exam At Time of Discharge  Physical Exam  General: no acute distress  Card: acyanotic, regular rate  Pulm: no increased WOB on RA  Neck: no crepitus    Home Medications     Medication List      CONTINUE taking these medications     acetaminophen 500 mg tablet; Commonly known as: Tylenol   albuterol 90 mcg/actuation inhaler   atorvastatin 20 mg tablet; Commonly known as: Lipitor   azelastine 137 mcg (0.1 %) nasal spray; Commonly known as: Astelin; USE   1 SPRAY IN EACH NOSTRIL TWICE DAILY.   divalproex 500 mg 24 hr tablet; Commonly known as: Depakote ER   * fluticasone 50 mcg/actuation nasal spray; Commonly known as: Flonase;   SPRAY 2 SPRAYS INTO EACH NOSTRIL EVERY DAY   * fluticasone 50 mcg/actuation nasal spray; Commonly known as: Flonase;   USE 2 SPRAYS INTO EACH NOSTRIL ONCE DAILY   * gabapentin 400 mg capsule; Commonly known as: Neurontin   * gabapentin 800 mg tablet; Commonly known as: Neurontin   hydrOXYzine HCL 10 mg tablet; Commonly known as: Atarax   hydrOXYzine pamoate 50 mg capsule;  Commonly known as: Vistaril   loratadine 10 mg capsule   meloxicam 7.5 mg tablet; Commonly known as: Mobic   montelukast 10 mg tablet; Commonly known as: Singulair   * omeprazole 40 mg DR capsule; Commonly known as: PriLOSEC; TAKE 1   CAPSULE BY MOUTH ONCE DAILY 30 MINUTES BEFORE DINNER   * omeprazole 40 mg DR capsule; Commonly known as: PriLOSEC; TAKE 1   CAPSULE BY MOUTH ONCE DAILY. TAKE 30MINUTES BEFORE DINNER.   QUEtiapine 200 mg tablet; Commonly known as: SEROquel   tamsulosin 0.4 mg 24 hr capsule; Commonly known as: Flomax   triamcinolone 0.1 % cream; Commonly known as: Kenalog  * This list has 6 medication(s) that are the same as other medications   prescribed for you. Read the directions carefully, and ask your doctor or   other care provider to review them with you.       Outpatient Follow-Up  Future Appointments   Date Time Provider Department Center   8/21/2024 10:15 AM Karo Azul MD [id number] Nanci Ennis MD

## 2024-08-01 NOTE — CARE PLAN
The patient's goals for the shift include      The clinical goals for the shift include pt will remain safe and free from injury through end of shift 8/1 @ 0700      Problem: Pain  Goal: Takes deep breaths with improved pain control throughout the shift  Outcome: Progressing  Goal: Turns in bed with improved pain control throughout the shift  Outcome: Progressing     Problem: Fall/Injury  Goal: Not fall by end of shift  Outcome: Progressing  Goal: Be free from injury by end of the shift  Outcome: Progressing     Problem: Pain  Goal: Turns in bed with improved pain control throughout the shift  Outcome: Progressing

## 2024-08-07 ENCOUNTER — TELEPHONE (OUTPATIENT)
Dept: OTOLARYNGOLOGY | Facility: HOSPITAL | Age: 59
End: 2024-08-07
Payer: COMMERCIAL

## 2024-08-07 ENCOUNTER — TELEPHONE (OUTPATIENT)
Dept: HEMATOLOGY/ONCOLOGY | Facility: HOSPITAL | Age: 59
End: 2024-08-07
Payer: COMMERCIAL

## 2024-08-07 NOTE — TELEPHONE ENCOUNTER
Spoke with patient. He reports that he has been taking the Tylenol and he says that he sleeps good. Reports 6/10 pain today, but yesterday it definitely was worse when he swallowed. Asked pt if he was able to take ibuprofen and he stated yes. I advised pt to alternate tylenol and ibuprofen. Encouraged him to call the office if his pain does not subside and if his breathing becomes difficult to go to the ED. Pt verbalized understanding.

## 2024-08-07 NOTE — TELEPHONE ENCOUNTER
Pt routed to [facility] triage line for a post-surgical need.  Directed pt to surgical phone lines. Pt aware.

## 2024-08-12 LAB
LABORATORY COMMENT REPORT: NORMAL
PATH REPORT.FINAL DX SPEC: NORMAL
PATH REPORT.GROSS SPEC: NORMAL
PATH REPORT.RELEVANT HX SPEC: NORMAL
PATH REPORT.TOTAL CANCER: NORMAL

## 2024-08-21 ENCOUNTER — APPOINTMENT (OUTPATIENT)
Dept: OTOLARYNGOLOGY | Facility: HOSPITAL | Age: 59
End: 2024-08-21
Payer: COMMERCIAL

## 2024-08-21 NOTE — PROGRESS NOTES
"Patient: Eriberto Biswas   MRN: [id number] YOB: 1965   Sex: male Age: 59 y.o.  Date of Service: 2024       ASSESSMENT AND PLAN  I discussed the findings with Eriberto Biswas and have recommended the followin. 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 Now with recurrent/persistent leukoplakia, s/p MDL with microflap excision of right vocal fold lesion, KTP last ablation 24  - ***    CHIEF COMPLAINT  Dysphonia, leukoplakia      HISTORY OF PRESENT ILLNESS  Eriberto Biswas is a 59 y.o. male referred by Dr. Up or evaluation of dysphonia and vocal fold leukoplakia.  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 Pathology returned as \"squamous epithelium with hyperkeratosis and epithelial atypia, favor low grade dysplasia\". The patient also has history of nasal congestion and a septal perforation from previous drug use.     24  Follow-up after MDL excision 24  The patient presents today for follow-up as Dr. Up has moved. His voice symptoms are fairly stable. He still continues to have a raspy voice as well as some pain in the anterior neck. He is trying to cut down on smoking     PMH: asthma, depression/bipolar, arthritis  SH: smoker 1 pack every 4 days, marijuana use. Lives at home alone     ADDITIONAL HISTORY  Past Medical History  He has a past medical history of Anxiety and depression, Asthma (HHS-HCC), Chronic pain, GERD (gastroesophageal reflux disease), History of substance abuse (Multi), History of suicide attempt, and HLD (hyperlipidemia). Surgical History  He has a past surgical history that includes Bronchoscopy and Vein Surgery.   Social History  He reports that he has been smoking cigarettes. He has never used smokeless tobacco. He reports that he does not currently use alcohol. He reports that he does not currently " "use drugs after having used the following drugs: \"Crack\" cocaine. Allergies  Oxycodone-acetaminophen     Family History  No family history on file.     REVIEW OF SYSTEMS  All 10 systems were reviewed and negative except for above.      PHYSICAL EXAM  ENT Physical Exam   GENERAL: Well-nourished and developed, alert and appropriate, no distress, voice [id number]  RESPIRATORY: Breathing quietly, no stridor  HEAD: Normocephalic atraumatic  FACE: Symmetric, no masses or lesions  EYES:  Pupils reactive, sclera clear, external ocular muscles intact, no nystagmus.    EARS:  Pinnae normal. External auditory canals clear and tympanic membranes intact.  NOSE:  No anterior lesions, masses or polyps.  ORAL CAVITY/OROPHARYNX:  Buccal mucosa is moist without lesions or masses, tongue midline and palate elevates symmetrically. Tongue mobility intact.  NECK:  Soft. There is no lymphadenopathy or thyromegaly.  TTP bilateral thyrohyoid space  NEUROLOGIC:  Cranial nerves II-XII grossly intact.       Last Recorded Vitals  There were no vitals taken for this visit.    RESULTS    Patient Reported Outcome Measures  N/A    Laboratory, Radiology, and Pathology  I personally reviewed the following results, with the following interpretation:   Pathology 7/31/24  FINAL DIAGNOSIS      A. RIGHT VOCAL CORD LESION, BIOPSY:  -- Superficial fragments of hyperplastic squamous epithelium with hyperkeratosis and at least low grade epithelial dysplasia, see note.     NOTE: Multiple deeper levels are reviewed. The changes seen are favored to represent low grade dysplasia, however involvement of the lamina propria by a more aggressive lesion is precluded by the superficial nature of the specimen. Correlation with clinical and endoscopic features is needed for further evaluation.            PROCEDURES  Flexible Stroboscopy In Clinic    Date/Time: 8/21/2024 7:32 AM    Performed by: Karo Azul MD  Authorized by: Karo Azul MD       Flexible Fiberoptic " Laryngoscopy with Stroboscopy    Patient failed a mirror exam due to limitations of equipment and the need for stroboscopy to assess glottic vibration and closure.     PREOPERATIVE DIAGNOSIS: Dysphonia    POSTOPERATIVE DIAGNOSIS: Same    PROCEDURE:  Strobovideolaryngoscopy    ANESTHESIA:  Topical    COMPLICATIONS:  None    SPECIMENS:  None    PROCEDURE IN DETAIL: The patient was seated in an upright position.  The nasal cavity was topically decongested and anesthetized.  The oropharynx was anesthesized with cetacaine. The stroboscopic microphone was held to the neck at the level of the larynx.  The distal chip video laryngoscope was passed through the nasal cavity.  The nasal cavity and nasopharynx were within normal limits except noted below.  The following findings on stroboscopy were noted:      Appearance of pharynx/larynx/Other Structural Lesions: no masses or lesions   Vocal Fold Mobility               Right VF: mobile, decreased abduction               Left VF: mobile, decreased abduction   TVF Appearance               Edema/Erythema: mild Zia's               Lesions/vibratory margin irregularities: R>L diffuse VF leukoplakia   Glottic Closure Pattern: complete    Vibration:               Phase: asymmetric    Periodicity: regular                Amplitude: decreased                Waveform: decreased     Muscle Tension Patterns:  severe lateral > AP   Other abnormal findings: none    The patient tolerated the procedure fairly. Severe gag reflex.      ----------------------------------------------------------------------  Karo Azul MD, MAEd    [facility]  Department of Otolaryngology - Head and Neck Surgery  [facility]    The total time I spent in care of this patient today (excluding time spent on other billable services) is as follows:

## 2024-09-16 ENCOUNTER — HOSPITAL ENCOUNTER (OUTPATIENT)
Dept: RADIOLOGY | Facility: HOSPITAL | Age: 59
Discharge: HOME | End: 2024-09-16
Payer: COMMERCIAL

## 2024-09-16 DIAGNOSIS — M67.919 UNSPECIFIED DISORDER OF SYNOVIUM AND TENDON, UNSPECIFIED SHOULDER: ICD-10-CM

## 2024-09-16 PROCEDURE — 73221 MRI JOINT UPR EXTREM W/O DYE: CPT | Mod: LEFT SIDE | Performed by: STUDENT IN AN ORGANIZED HEALTH CARE EDUCATION/TRAINING PROGRAM

## 2024-09-16 PROCEDURE — 73221 MRI JOINT UPR EXTREM W/O DYE: CPT | Mod: LT

## 2024-09-25 ENCOUNTER — APPOINTMENT (OUTPATIENT)
Dept: OTOLARYNGOLOGY | Facility: HOSPITAL | Age: 59
End: 2024-09-25
Payer: COMMERCIAL

## 2024-11-07 ENCOUNTER — HOSPITAL ENCOUNTER (EMERGENCY)
Facility: HOSPITAL | Age: 59
Discharge: HOME | End: 2024-11-07
Payer: COMMERCIAL

## 2024-11-07 VITALS
DIASTOLIC BLOOD PRESSURE: 81 MMHG | WEIGHT: 197.53 LBS | BODY MASS INDEX: 29.26 KG/M2 | RESPIRATION RATE: 18 BRPM | SYSTOLIC BLOOD PRESSURE: 135 MMHG | HEART RATE: 70 BPM | TEMPERATURE: 97.7 F | OXYGEN SATURATION: 100 % | HEIGHT: 69 IN

## 2024-11-07 DIAGNOSIS — H61.23 IMPACTED CERUMEN OF BOTH EARS: Primary | ICD-10-CM

## 2024-11-07 PROCEDURE — 99283 EMERGENCY DEPT VISIT LOW MDM: CPT

## 2024-11-07 PROCEDURE — 2500000001 HC RX 250 WO HCPCS SELF ADMINISTERED DRUGS (ALT 637 FOR MEDICARE OP)

## 2024-11-07 PROCEDURE — 99282 EMERGENCY DEPT VISIT SF MDM: CPT

## 2024-11-07 RX ORDER — ACETAMINOPHEN 325 MG/1
975 TABLET ORAL ONCE
Status: COMPLETED | OUTPATIENT
Start: 2024-11-07 | End: 2024-11-07

## 2024-11-07 RX ORDER — ACETAMINOPHEN 325 MG/1
650 TABLET ORAL EVERY 6 HOURS PRN
Qty: 28 TABLET | Refills: 0 | Status: SHIPPED | OUTPATIENT
Start: 2024-11-07 | End: 2024-11-14

## 2024-11-07 ASSESSMENT — COLUMBIA-SUICIDE SEVERITY RATING SCALE - C-SSRS
1. IN THE PAST MONTH, HAVE YOU WISHED YOU WERE DEAD OR WISHED YOU COULD GO TO SLEEP AND NOT WAKE UP?: NO
1. IN THE PAST MONTH, HAVE YOU WISHED YOU WERE DEAD OR WISHED YOU COULD GO TO SLEEP AND NOT WAKE UP?: NO
2. HAVE YOU ACTUALLY HAD ANY THOUGHTS OF KILLING YOURSELF?: NO
6. HAVE YOU EVER DONE ANYTHING, STARTED TO DO ANYTHING, OR PREPARED TO DO ANYTHING TO END YOUR LIFE?: NO
2. HAVE YOU ACTUALLY HAD ANY THOUGHTS OF KILLING YOURSELF?: NO

## 2024-11-07 ASSESSMENT — LIFESTYLE VARIABLES
HAVE PEOPLE ANNOYED YOU BY CRITICIZING YOUR DRINKING: NO
TOTAL SCORE: 0
EVER FELT BAD OR GUILTY ABOUT YOUR DRINKING: NO
HAVE YOU EVER FELT YOU SHOULD CUT DOWN ON YOUR DRINKING: NO
EVER HAD A DRINK FIRST THING IN THE MORNING TO STEADY YOUR NERVES TO GET RID OF A HANGOVER: NO

## 2024-11-07 ASSESSMENT — PAIN - FUNCTIONAL ASSESSMENT: PAIN_FUNCTIONAL_ASSESSMENT: 0-10

## 2024-11-07 ASSESSMENT — PAIN SCALES - GENERAL: PAINLEVEL_OUTOF10: 10 - WORST POSSIBLE PAIN
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
## 2024-12-20 ENCOUNTER — LAB (OUTPATIENT)
Dept: LAB | Facility: LAB | Age: 59
End: 2024-12-20
Payer: COMMERCIAL

## 2024-12-20 DIAGNOSIS — E55.9 VITAMIN D DEFICIENCY, UNSPECIFIED: ICD-10-CM

## 2024-12-20 DIAGNOSIS — Z71.51 DRUG ABUSE COUNSELING AND SURVEILLANCE OF DRUG ABUSER: ICD-10-CM

## 2024-12-20 DIAGNOSIS — Z01.89 ENCOUNTER FOR OTHER SPECIFIED SPECIAL EXAMINATIONS: Primary | ICD-10-CM

## 2025-02-05 ENCOUNTER — OFFICE VISIT (OUTPATIENT)
Dept: OTOLARYNGOLOGY | Facility: HOSPITAL | Age: 60
End: 2025-02-05
Payer: MEDICARE

## 2025-02-05 ENCOUNTER — APPOINTMENT (OUTPATIENT)
Dept: LAB | Facility: HOSPITAL | Age: 60
End: 2025-02-05
Payer: MEDICARE

## 2025-02-05 VITALS — HEIGHT: 68 IN | TEMPERATURE: 98.8 F | WEIGHT: 215.5 LBS | BODY MASS INDEX: 32.66 KG/M2

## 2025-02-05 DIAGNOSIS — Z29.89 ENCOUNTER FOR HYDRATION PRIOR TO CT SCAN: ICD-10-CM

## 2025-02-05 DIAGNOSIS — R69 ILLNESS, UNSPECIFIED: Primary | ICD-10-CM

## 2025-02-05 DIAGNOSIS — R49.0 DYSPHONIA: Primary | ICD-10-CM

## 2025-02-05 DIAGNOSIS — J38.3 VOCAL FOLD LEUKOPLAKIA: ICD-10-CM

## 2025-02-05 DIAGNOSIS — M54.2 NECK PAIN: ICD-10-CM

## 2025-02-05 LAB
CREAT SERPL-MCNC: 0.84 MG/DL (ref 0.5–1.3)
EGFRCR SERPLBLD CKD-EPI 2021: >90 ML/MIN/1.73M*2
HOLD SPECIMEN: NORMAL

## 2025-02-05 PROCEDURE — 82565 ASSAY OF CREATININE: CPT | Performed by: OTOLARYNGOLOGY

## 2025-02-05 PROCEDURE — 99214 OFFICE O/P EST MOD 30 MIN: CPT | Mod: 25 | Performed by: OTOLARYNGOLOGY

## 2025-02-05 PROCEDURE — 99214 OFFICE O/P EST MOD 30 MIN: CPT | Performed by: OTOLARYNGOLOGY

## 2025-02-05 PROCEDURE — 36415 COLL VENOUS BLD VENIPUNCTURE: CPT | Performed by: OTOLARYNGOLOGY

## 2025-02-05 PROCEDURE — 3008F BODY MASS INDEX DOCD: CPT | Performed by: OTOLARYNGOLOGY

## 2025-02-05 ASSESSMENT — PATIENT HEALTH QUESTIONNAIRE - PHQ9
SUM OF ALL RESPONSES TO PHQ9 QUESTIONS 1 AND 2: 0
1. LITTLE INTEREST OR PLEASURE IN DOING THINGS: NOT AT ALL
2. FEELING DOWN, DEPRESSED OR HOPELESS: NOT AT ALL

## 2025-02-05 ASSESSMENT — PAIN SCALES - GENERAL: PAINLEVEL_OUTOF10: 4

## 2025-02-05 NOTE — PROGRESS NOTES
"Patient: Eriberto Biswas   MRN: [id number] YOB: 1965   Sex: male Age: 59 y.o.  Date of Service: 2025       ASSESSMENT AND PLAN  I discussed the findings with Eriberto Biswas and have recommended the followin. 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 Now with new left neck pain  - CT neck with contrast, I will review results and call him with any findings that need follow-up  - Follow-up 3-4 months, or sooner if issues      CHIEF COMPLAINT  Chief Complaint   Patient presents with    Post-op Visit    Dysphonia       HISTORY OF PRESENT ILLNESS  Eriberto Biswas is a very kind 59 y.o. 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 He is s/p MDL and KTP laser ablation 24, path low grade dysplasia.     Returns today after missing prior follow-ups. He reports his voice was better for a while but recently was getting worse again. He also notes new left neck pain over the past 2 months    ADDITIONAL HISTORY  Past Medical History  He has a past medical history of Anxiety and depression, Asthma, Chronic pain, GERD (gastroesophageal reflux disease), History of substance abuse (Multi), History of suicide attempt, and HLD (hyperlipidemia). Surgical History  He has a past surgical history that includes Bronchoscopy and Vein Surgery.   Social History  He reports that he has been smoking cigarettes. He has never used smokeless tobacco. He reports that he does not currently use alcohol. He reports that he does not currently use drugs after having used the following drugs: \"Crack\" cocaine. Allergies  Oxycodone-acetaminophen     Family History  No family history on file.     REVIEW OF SYSTEMS  All 10 systems were reviewed and negative except for above.      PHYSICAL EXAM  ENT Physical Exam   GENERAL: Well-nourished and developed, alert and appropriate, no distress, voice [id number]  RESPIRATORY: Breathing " "quietly, no stridor  EYES:  Pupils reactive, sclera clear, external ocular muscles intact, no nystagmus.    EARS:  Pinnae normal.  NOSE:  No anterior lesions, masses or polyps.  ORAL CAVITY/OROPHARYNX:  Buccal mucosa is moist without lesions or masses, tongue midline and palate elevates symmetrically.  NECK:  Soft. There is no lymphadenopathy or thyromegaly. Mild-mod TTP on left neck in level 2  NEUROLOGIC:  Cranial nerves II-XII grossly intact.     Last Recorded Vitals  Temperature 37.1 °C (98.8 °F), height 1.727 m (5' 8\"), weight 97.8 kg (215 lb 8 oz).    RESULTS    Patient Reported Outcome Measures  N/A    Laboratory, Radiology, and Pathology  I personally reviewed the following results, with the following interpretation:   N/A    PROCEDURES  Flexible Fiberoptic Laryngoscopy with Stroboscopy    Patient failed a mirror exam due to limitations of equipment and the need for stroboscopy to assess glottic vibration and closure.     PREOPERATIVE DIAGNOSIS: Dysphonia    POSTOPERATIVE DIAGNOSIS: Same    PROCEDURE:  Strobovideolaryngoscopy    ANESTHESIA:  Topical    COMPLICATIONS:  None    SPECIMENS:  None    PROCEDURE IN DETAIL: The patient was seated in an upright position.  The nasal cavity was topically decongested and anesthetized.  The stroboscopic microphone was held to the neck at the level of the larynx.  The distal chip video laryngoscope was passed through the nasal cavity.  The nasal cavity and nasopharynx were within normal limits except noted below.  The following findings on stroboscopy were noted:      Tongue Base: no masses or lesions   Vocal Fold Mobility               Right VF: mobile               Left VF: mobile   TVF Appearance               Edema/Erythema: mild edema               Lesions/vibratory margin irregularities: sulcus on left, mild leukoplakia improved from prior   Glottic Closure Pattern: complete    Vibration:               Phase: asymmetric   Periodicity: regular               " Amplitude: decreased                Waveform: decreased    Muscle Tension Patterns:  lateral > AP   Other Findings: n/a    The patient tolerated the procedure well.       ----------------------------------------------------------------------  Karo Azul MD, MAEd    [facility]  Department of Otolaryngology - Head and Neck Surgery  [facility]    The total time I spent in care of this patient today (excluding time spent on other billable services) is as follows:    Time Spent  Prep time on day of patient encounter: 5 minutes  Time spent directly with patient, family or caregiver: 15 minutes  Additional Time Spent on Patient Care Activities: 5 minutes  Documentation Time: 5 minutes  Other Time Spent: 0 minutes  Total: 30 minutes

## 2025-05-14 ENCOUNTER — OFFICE VISIT (OUTPATIENT)
Dept: OTOLARYNGOLOGY | Facility: HOSPITAL | Age: 60
End: 2025-05-14
Payer: MEDICAID

## 2025-05-14 VITALS — WEIGHT: 212 LBS | HEIGHT: 68 IN | BODY MASS INDEX: 32.13 KG/M2 | TEMPERATURE: 98.2 F

## 2025-05-14 DIAGNOSIS — M54.2 NECK PAIN: ICD-10-CM

## 2025-05-14 DIAGNOSIS — R49.0 DYSPHONIA: Primary | ICD-10-CM

## 2025-05-14 DIAGNOSIS — J38.3 VOCAL FOLD LEUKOPLAKIA: ICD-10-CM

## 2025-05-14 PROCEDURE — 31579 LARYNGOSCOPY TELESCOPIC: CPT | Performed by: OTOLARYNGOLOGY

## 2025-05-14 PROCEDURE — 99215 OFFICE O/P EST HI 40 MIN: CPT | Mod: 25 | Performed by: OTOLARYNGOLOGY

## 2025-05-14 PROCEDURE — 3008F BODY MASS INDEX DOCD: CPT | Performed by: OTOLARYNGOLOGY

## 2025-05-14 PROCEDURE — 99215 OFFICE O/P EST HI 40 MIN: CPT | Performed by: OTOLARYNGOLOGY

## 2025-05-14 RX ORDER — TIOTROPIUM BROMIDE 18 UG/1
CAPSULE ORAL; RESPIRATORY (INHALATION)
COMMUNITY
Start: 2025-03-03

## 2025-05-14 ASSESSMENT — PATIENT HEALTH QUESTIONNAIRE - PHQ9
SUM OF ALL RESPONSES TO PHQ9 QUESTIONS 1 & 2: 0
2. FEELING DOWN, DEPRESSED OR HOPELESS: NOT AT ALL
1. LITTLE INTEREST OR PLEASURE IN DOING THINGS: NOT AT ALL

## 2025-05-14 ASSESSMENT — PAIN SCALES - GENERAL: PAINLEVEL_OUTOF10: 0-NO PAIN

## 2025-05-14 NOTE — PROGRESS NOTES
"Patient: Eriberto Biswas   MRN: [id number] YOB: 1965   Sex: male Age: 59 y.o.  Date of Service: 2025       ASSESSMENT AND PLAN  I discussed the findings with Eriberto Biswas and have recommended the followin. 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 Now with new left neck pain  - CT neck with contrast reordered, I will review results and call him for next steps  - Follow-up 4-6 weeks to ensure completion of CT, or sooner if issues      CHIEF COMPLAINT  Chief Complaint   Patient presents with    Follow-up     dysphonia       HISTORY OF PRESENT ILLNESS  Eriberto Biswas is a very kind 59 y.o. 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 He is s/p MDL and KTP laser ablation 24, path low grade dysplasia.     25  Here for follow-up. He reports he forgot about his CT scan so it was never completed. He is still having left neck pain. No other new symptoms    25  Returns today after missing prior follow-ups. He reports his voice was better for a while but recently was getting worse again. He also notes new left neck pain over the past 2 months    ADDITIONAL HISTORY  Past Medical History  He has a past medical history of Anxiety and depression, Asthma, Chronic pain, GERD (gastroesophageal reflux disease), History of substance abuse, History of suicide attempt, and HLD (hyperlipidemia). Surgical History  He has a past surgical history that includes Bronchoscopy and Vein Surgery.   Social History  He reports that he has been smoking cigarettes. He has never used smokeless tobacco. He reports that he does not currently use alcohol. He reports that he does not currently use drugs after having used the following drugs: \"Crack\" cocaine. Allergies  Oxycodone-acetaminophen     Family History  No family history on file.     REVIEW OF SYSTEMS  All 10 systems were reviewed and negative except for " "above.      PHYSICAL EXAM  ENT Physical Exam   GENERAL: Well-nourished and developed, alert and appropriate, no distress, voice [id number]  RESPIRATORY: Breathing quietly, no stridor  EYES:  Pupils reactive, sclera clear, external ocular muscles intact, no nystagmus.    EARS:  Pinnae normal.  NOSE:  No anterior lesions, masses or polyps.  ORAL CAVITY/OROPHARYNX:  Buccal mucosa is moist without lesions or masses, tongue midline and palate elevates symmetrically.  NECK:  Soft. There is no lymphadenopathy or thyromegaly. Mod-severe TTP on left neck in level 2  NEUROLOGIC:  Cranial nerves II-XII grossly intact.     Last Recorded Vitals  Temperature 36.8 °C (98.2 °F), temperature source Temporal, height 1.727 m (5' 8\"), weight 96.2 kg (212 lb).    RESULTS    Patient Reported Outcome Measures  N/A    Laboratory, Radiology, and Pathology  I personally reviewed the following results, with the following interpretation:   N/A    PROCEDURES  Flexible Fiberoptic Laryngoscopy with Stroboscopy    Patient failed a mirror exam due to limitations of equipment and the need for stroboscopy to assess glottic vibration and closure.     PREOPERATIVE DIAGNOSIS: Dysphonia    POSTOPERATIVE DIAGNOSIS: Same    PROCEDURE:  Strobovideolaryngoscopy    ANESTHESIA:  Topical    COMPLICATIONS:  None    SPECIMENS:  None    PROCEDURE IN DETAIL: The patient was seated in an upright position.  The nasal cavity was topically decongested and anesthetized.  The stroboscopic microphone was held to the neck at the level of the larynx.  The distal chip video laryngoscope was passed through the nasal cavity.  The nasal cavity and nasopharynx were within normal limits except noted below.  The following findings on stroboscopy were noted:      Tongue Base: no masses or lesions   Vocal Fold Mobility               Right VF: mobile               Left VF: mobile   TVF Appearance               Edema/Erythema: mild edema               Lesions/vibratory margin " irregularities: sulcus on left with Zia's,    Glottic Closure Pattern: complete    Vibration:               Phase: asymmetric   Periodicity: regular               Amplitude: decreased                Waveform: decreased    Muscle Tension Patterns:  lateral > AP   Other Findings: n/a    The patient tolerated the procedure well.       ----------------------------------------------------------------------  Karo Azul MD, MAEd    [facility]  Department of Otolaryngology - Head and Neck Surgery  [facility]    The total time I spent in care of this patient today (excluding time spent on other billable services) is as follows:    Time Spent  Prep time on day of patient encounter: 5 minutes  Time spent directly with patient, family or caregiver: 15 minutes  Additional Time Spent on Patient Care Activities: 5 minutes  Documentation Time: 5 minutes  Other Time Spent: 0 minutes  Total: 30 minutes

## 2025-05-23 DIAGNOSIS — R49.0 DYSPHONIA: Primary | ICD-10-CM

## 2025-05-24 DIAGNOSIS — R49.0 DYSPHONIA: ICD-10-CM

## 2025-05-27 ENCOUNTER — HOSPITAL ENCOUNTER (OUTPATIENT)
Dept: RADIOLOGY | Facility: HOSPITAL | Age: 60
Discharge: HOME | End: 2025-05-27
Payer: MEDICARE

## 2025-05-27 DIAGNOSIS — R49.0 DYSPHONIA: ICD-10-CM

## 2025-05-27 DIAGNOSIS — M54.2 NECK PAIN: ICD-10-CM

## 2025-05-27 PROCEDURE — 2550000001 HC RX 255 CONTRASTS: Mod: SE | Performed by: OTOLARYNGOLOGY

## 2025-05-27 PROCEDURE — 70491 CT SOFT TISSUE NECK W/DYE: CPT

## 2025-05-27 PROCEDURE — 70491 CT SOFT TISSUE NECK W/DYE: CPT | Performed by: RADIOLOGY

## 2025-05-27 RX ADMIN — IOHEXOL 75 ML: 350 INJECTION, SOLUTION INTRAVENOUS at 09:25

## (undated) DEVICE — SYRINGE, HYPODERMIC, TB, W/O NEEDLE, 1 CC, SLIP TIP

## (undated) DEVICE — TUBING, SUCTION, CONNECTING, STERILE 0.25 X 120 IN., LF

## (undated) DEVICE — COUNTER, NEEDLE, FOAM BLOCK, W/MAGNET, W/BLADE GUARD, 10 COUNT, RED, LF

## (undated) DEVICE — CUP, SOLUTION

## (undated) DEVICE — COVER, CART, 45 X 27 X 48 IN, CLEAR

## (undated) DEVICE — Device

## (undated) DEVICE — COVER, MAYO STAND, W/PAD, 23 IN, DISPOSABLE, PLASTIC, LF, STERILE

## (undated) DEVICE — PITCHER, GRADUATE, 32 OZ (1200CC), STERILE

## (undated) DEVICE — SHIELD, EYE, FOX, CONVEX, ALUMINUM, NS

## (undated) DEVICE — PAD, EYE, OVAL, 1 5/8 X 2 5/8 IN, STERILE

## (undated) DEVICE — 400U BARE LASER FIBER

## (undated) DEVICE — COVER, TABLE, 44 X 75 IN, DISPOSABLE, LF, STERILE

## (undated) DEVICE — MANIFOLD, 4 PORT NEPTUNE STANDARD

## (undated) DEVICE — NEEDLE, INJECTION, OROTRACHEAL

## (undated) DEVICE — BOWL, UTILITY, 32 OZ, PLASTIC, STERILE

## (undated) DEVICE — MARKER, SKIN, RULER AND LABEL PACK, CUSTOM

## (undated) DEVICE — SYRINGE, 60 CC, IRRIGATION, BULB, CONTRO-BULB, PAPER POUCH

## (undated) DEVICE — DENTITION PROTECTOR, QUICKGUARD, NON-PERF

## (undated) DEVICE — REST, HEAD, BAGEL, 9 IN